# Patient Record
Sex: FEMALE | Race: AMERICAN INDIAN OR ALASKA NATIVE | NOT HISPANIC OR LATINO | Employment: FULL TIME | URBAN - METROPOLITAN AREA
[De-identification: names, ages, dates, MRNs, and addresses within clinical notes are randomized per-mention and may not be internally consistent; named-entity substitution may affect disease eponyms.]

---

## 2017-01-17 ENCOUNTER — APPOINTMENT (OUTPATIENT)
Dept: RADIOLOGY | Facility: MEDICAL CENTER | Age: 57
End: 2017-01-17
Attending: EMERGENCY MEDICINE
Payer: COMMERCIAL

## 2017-01-17 ENCOUNTER — HOSPITAL ENCOUNTER (EMERGENCY)
Facility: MEDICAL CENTER | Age: 57
End: 2017-01-17
Attending: EMERGENCY MEDICINE
Payer: COMMERCIAL

## 2017-01-17 VITALS
HEART RATE: 70 BPM | SYSTOLIC BLOOD PRESSURE: 166 MMHG | TEMPERATURE: 98 F | RESPIRATION RATE: 18 BRPM | WEIGHT: 130.95 LBS | DIASTOLIC BLOOD PRESSURE: 87 MMHG | HEIGHT: 59 IN | BODY MASS INDEX: 26.4 KG/M2 | OXYGEN SATURATION: 98 %

## 2017-01-17 DIAGNOSIS — S92.354A CLOSED NONDISPLACED FRACTURE OF FIFTH METATARSAL BONE OF RIGHT FOOT, INITIAL ENCOUNTER: ICD-10-CM

## 2017-01-17 PROCEDURE — 99284 EMERGENCY DEPT VISIT MOD MDM: CPT

## 2017-01-17 PROCEDURE — 302874 HCHG BANDAGE ACE 2 OR 3""

## 2017-01-17 PROCEDURE — 73630 X-RAY EXAM OF FOOT: CPT | Mod: RT

## 2017-01-17 PROCEDURE — A9270 NON-COVERED ITEM OR SERVICE: HCPCS | Performed by: EMERGENCY MEDICINE

## 2017-01-17 PROCEDURE — 29515 APPLICATION SHORT LEG SPLINT: CPT

## 2017-01-17 PROCEDURE — 700102 HCHG RX REV CODE 250 W/ 637 OVERRIDE(OP): Performed by: EMERGENCY MEDICINE

## 2017-01-17 RX ORDER — HYDROCODONE BITARTRATE AND ACETAMINOPHEN 5; 325 MG/1; MG/1
1 TABLET ORAL EVERY 6 HOURS PRN
Qty: 16 TAB | Refills: 0 | Status: SHIPPED | OUTPATIENT
Start: 2017-01-17

## 2017-01-17 RX ORDER — HYDROCODONE BITARTRATE AND ACETAMINOPHEN 5; 325 MG/1; MG/1
1 TABLET ORAL ONCE
Status: COMPLETED | OUTPATIENT
Start: 2017-01-17 | End: 2017-01-17

## 2017-01-17 RX ADMIN — HYDROCODONE BITARTRATE AND ACETAMINOPHEN 1 TABLET: 5; 325 TABLET ORAL at 22:08

## 2017-01-17 ASSESSMENT — PAIN SCALES - GENERAL
PAINLEVEL_OUTOF10: 4
PAINLEVEL_OUTOF10: 5

## 2017-01-17 NOTE — ED AVS SNAPSHOT
1/17/2017          Iwona Conley  [address]   [place] [address]    Dear Iwona:    [facility] wants to ensure your discharge home is safe and you or your loved ones have had all your questions answered regarding your care after you leave the hospital.    You may receive a telephone call within two days of your discharge.  This call is to make certain you understand your discharge instructions as well as ensure we provided you with the best care possible during your stay with us.     The call will only last approximately 3-5 minutes and will be done by a nurse.    Once again, we want to ensure your discharge home is safe and that you have a clear understanding of any next steps in your care.  If you have any questions or concerns, please do not hesitate to contact us, we are here for you.  Thank you for choosing [facility] for your healthcare needs.    Sincerely,    Declan Mclean    [facility]

## 2017-01-17 NOTE — ED AVS SNAPSHOT
[employer] Access Code: Activation code not generated  Current [employer] Status: Active    "[employer]hart  A secure, online tool to manage your health information     [employer]’s [employer]® is a secure, online tool that connects you to your personalized health information from the privacy of your home -- day or night - making it very easy for you to manage your healthcare. Once the activation process is completed, you can even access your medical information using the [employer]ologie Biol[employer] angelica, which is available for free in the Apple Angelica store or Google Play store.     [employer] provides the following levels of access (as shown below):   My Chart Features   [facility] Primary Care Doctor [facility]  Specialists [facility]  Urgent  Care Non-[facility]  Primary Care  Doctor   Email your healthcare team securely and privately 24/7 X X X X   Manage appointments: schedule your next appointment; view details of past/upcoming appointments X      Request prescription refills. X      View recent personal medical records, including lab and immunizations X X X X   View health record, including health history, allergies, medications X X X X   Read reports about your outpatient visits, procedures, consult and ER notes X X X X   See your discharge summary, which is a recap of your hospital and/or ER visit that includes your diagnosis, lab results, and care plan. X X       How to register for [employer]:  1. Go to  [email or web address].  2. Click on the Sign Up Now box, which takes you to the New Member Sign Up page. You will need to provide the following information:  a. Enter your [employer] Access Code exactly as it appears at the top of this page. (You will not need to use this code after you’ve completed the sign-up process. If you do not sign up before the expiration date, you must request a new code.)   b. Enter your date of birth.   c. Enter your home email address.   d. Click Submit, and follow the next screen’s instructions.  3. Create a [employer] ID. This will  be your [facility] login ID and cannot be changed, so think of one that is secure and easy to remember.  4. Create a [employer] password. You can change your password at any time.  5. Enter your Password Reset Question and Answer. This can be used at a later time if you forget your password.   6. Enter your e-mail address. This allows you to receive e-mail notifications when new information is available in [employer].  7. Click Sign Up. You can now view your health information.    For assistance activating your [employer] account, call [phone]

## 2017-01-17 NOTE — ED AVS SNAPSHOT
After Visit Summary                                                                                                                Iwona Conley   MRN: [id number]    Department:  [facility], Emergency Dept   Date of Visit:  1/17/2017            [facility], Emergency Dept    [address]    [place] [address]    Phone:  [phone]      You were seen by     Antony Arana M.D.      Your Diagnosis Was     Closed nondisplaced fracture of fifth metatarsal bone of right foot, initial encounter     S92.354A       These are the medications you received during your hospitalization from 01/17/2017 1926 to 01/17/2017 2229     Date/Time Order Dose Route Action    01/17/2017 2208 hydrocodone-acetaminophen (NORCO) 5-325 MG per tablet 1 Tab 1 Tab Oral Given      Follow-up Information     1. Follow up with Desmond Gutierrez M.D.. Call in 1 day.    Specialty:  Orthopaedics    Contact information    [address]  [address]  [place] [id number] [phone]        Medication Information     Review all of your home medications and newly ordered medications with your primary doctor and/or pharmacist as soon as possible. Follow medication instructions as directed by your doctor and/or pharmacist.     Please keep your complete medication list with you and share with your physician. Update the information when medications are discontinued, doses are changed, or new medications (including over-the-counter products) are added; and carry medication information at all times in the event of emergency situations.               Medication List      START taking these medications        Instructions    hydrocodone-acetaminophen 5-325 MG Tabs per tablet   Commonly known as:  NORCO    Take 1 Tab by mouth every 6 hours as needed.   Dose:  1 Tab               Procedures and tests performed during your visit     DX-FOOT-COMPLETE 3+ RIGHT            Patient Information     Patient  Information    Following emergency treatment: all patient requiring follow-up care must return either to a private physician or a clinic if your condition worsens before you are able to obtain further medical attention, please return to the emergency room.     Billing Information    [facility] we work to make the billing process streamlined for our patients.  Our Representatives are here to answer any questions you may have regarding your hospital bill.  If you have insurance coverage and have supplied your insurance information to us, we will submit a claim to your insurer on your behalf.  Should you have any questions regarding your bill, we can be reached online or by phone as follows:  Online: You are able pay your bills online or live chat with our representatives about any billing questions you may have. We are here to help Monday - Friday from 8:00am to 7:30pm and 9:00am - 12:00pm on Saturdays.  Please visit [email or web address]  for more information.   Phone:  [phone] or [phone]    Please note that your emergency physician, surgeon, pathologist, radiologist, anesthesiologist, and other specialists are not employed by [facility] and will therefore bill separately for their services.  Please contact them directly for any questions concerning their bills at the numbers below:     Emergency Physician Services:  [phone]  [facility]:  [phone]  [facility]:  [phone]  [facility]:  [phone]    1. Your final bill may vary from the amount quoted upon discharge if all procedures are not complete at that time, or if your doctor has additional procedures of which we are not aware. You will receive an additional bill if you return to the Emergency Department at [facility] for suture removal regardless of the facility of which the sutures were placed.     2. Please arrange for settlement of this account  at the emergency registration.    3. All self-pay accounts are due in full at the time of treatment.  If you are unable to meet this obligation then payment is expected within 4-5 days.     4. If you have had radiology studies (CT, X-ray, Ultrasound, MRI), you have received a preliminary result during your emergency department visit. Please contact the radiology department [phone] to receive a copy of your final result. Please discuss the Final result with your primary physician or with the follow up physician provided.     Crisis Hotline:  [place] Crisis Hotline:  4-932-FGRGSSN or 1-814.158.4299  [place] Crisis Hotline:    1-792.350.5996 or 001-767-7508         ED Discharge Follow Up Questions    1. In order to provide you with very good care, we would like to follow up with a phone call in the next few days.  May we have your permission to contact you?     YES /  NO    2. What is the best phone number to call you? (       )_____-__________    3. What is the best time to call you?      Morning  /  Afternoon  /  Evening                   Patient Signature:  ____________________________________________________________    Date:  ____________________________________________________________

## 2017-01-18 NOTE — ED NOTES
Pt given discharge and follow up instructions and prescriptions, all questions answered, instructed not to drive while taking narcotics, pt verbalized understanding. Pt discharged with family. Copy of discharge provided to pt. Signed copy in chart.  Pt states that all personal belongings are in possession.

## 2017-01-18 NOTE — ED PROVIDER NOTES
ED Provider Note    HPI: Patient is a 56-year-old female who presented to the emergency Department generous 17 2017 at 7:26 PM with a chief complaint of right foot pain.    Patient twisted her foot in a parking lot. She denies any ankle or knee pain. She denied numbness or tingling of the right lower extremity. No other somatic complaints.    Review of Systems: Positive right foot pain negative right ankle or knee pain negative for numbness tingling right lower extremity    Past medical/surgical history: Breast biopsy    Medications: None    Allergies: None    Social History: No drug or alcohol use      Physical exam: Constitutional: Pleasant female awake and alert  Vital signs:  Blood pressure 127/81 pulse 65 temperature 98.4 respirations 16 pulse oximetry 99%  Musculoskeletal: Across the dorsal aspect of the right foot that slightly increased with palpation. No crepitance felt no deformity seen. I can elicit no pain with palpation of ankle or knee unaffected extremity. Dorsalis pedis pulse 2+ capillary refill less than 2 seconds  Neurologic: alert and awake answers questions appropriately. Moves all four extremities independently, no gross focal abnormalities identified. Normal strength and motor.  Skin: no rash or lesion seen, no palpable dermatologic lesions identified.    Medical decision making:  X-ray right foot obtained; fracture the base of the 5th metatarsal is noted.    Patient is slightly hypertensive on arrival. I did not think this had anything to do with the patient's presenting complaints. She'll follow up with her primary care physician for recheck.    Patient placed in an OCL and discharged on crutches. She is given a dose of Norco here. She'll be discharged on Norco (drug database reviewed, no evidence of inappropriate utilization    Patient referred to follow-up with Dr. Gutierrez, orthopedist on call. She is carefully counseled to return to ED for increasing pain numbness tingling or any other  problems    Patient verbalized understanding these instructions and states she will comply    Impression fractured metatarsal, 5th, right, closed

## 2017-01-18 NOTE — ED NOTES
"Chief Complaint   Patient presents with   • Foot Pain     R side. Pt twisted foot and now reporting shooting pain. Denies numbness or tingling. CMS intact.      /81 mmHg  Pulse 65  Temp(Src) 36.9 °C (98.4 °F) (Temporal)  Resp 16  Ht 1.499 m (4' 11.02\")  Wt 59.4 kg (130 lb 15.3 oz)  BMI 26.44 kg/m2  SpO2 99%    Pt ambulated into triage, complaints as above. VS as above, NAD, encouraged to return to the triage nurse or tech with any new complaints or symptoms.  "

## 2020-02-19 ENCOUNTER — HOSPITAL ENCOUNTER (OUTPATIENT)
Dept: CARDIOLOGY | Facility: MEDICAL CENTER | Age: 60
End: 2020-02-19
Attending: NURSE PRACTITIONER
Payer: COMMERCIAL

## 2020-02-19 DIAGNOSIS — R01.1 CARDIAC MURMUR: ICD-10-CM

## 2020-02-19 LAB
LV EJECT FRACT  99904: 65
LV EJECT FRACT MOD 2C 99903: 70.41
LV EJECT FRACT MOD 4C 99902: 71.28
LV EJECT FRACT MOD BP 99901: 71.17

## 2020-02-19 PROCEDURE — 93306 TTE W/DOPPLER COMPLETE: CPT | Mod: 26 | Performed by: INTERNAL MEDICINE

## 2020-02-19 PROCEDURE — 93306 TTE W/DOPPLER COMPLETE: CPT

## 2020-08-14 ENCOUNTER — OFFICE VISIT (OUTPATIENT)
Dept: URGENT CARE | Facility: PHYSICIAN GROUP | Age: 60
End: 2020-08-14
Payer: COMMERCIAL

## 2020-08-14 VITALS
RESPIRATION RATE: 18 BRPM | BODY MASS INDEX: 27.34 KG/M2 | TEMPERATURE: 97.6 F | SYSTOLIC BLOOD PRESSURE: 122 MMHG | DIASTOLIC BLOOD PRESSURE: 90 MMHG | HEART RATE: 60 BPM | HEIGHT: 59 IN | WEIGHT: 135.6 LBS | OXYGEN SATURATION: 99 %

## 2020-08-14 DIAGNOSIS — T81.30XA DEHISCENCE OF WOUND: ICD-10-CM

## 2020-08-14 DIAGNOSIS — S81.811A LACERATION OF RIGHT LOWER LEG, INITIAL ENCOUNTER: ICD-10-CM

## 2020-08-14 PROCEDURE — 99203 OFFICE O/P NEW LOW 30 MIN: CPT | Performed by: NURSE PRACTITIONER

## 2020-08-14 RX ORDER — CETIRIZINE HYDROCHLORIDE 10 MG/1
10 TABLET ORAL DAILY
COMMUNITY

## 2020-08-14 RX ORDER — LEVOTHYROXINE SODIUM 0.07 MG/1
75 TABLET ORAL
COMMUNITY

## 2020-08-14 RX ORDER — AMLODIPINE BESYLATE 10 MG/1
10 TABLET ORAL DAILY
COMMUNITY

## 2020-08-14 RX ORDER — ATENOLOL 50 MG/1
50 TABLET ORAL DAILY
COMMUNITY

## 2020-08-14 ASSESSMENT — ENCOUNTER SYMPTOMS
CHILLS: 0
BRUISES/BLEEDS EASILY: 0
MYALGIAS: 1
WEAKNESS: 0
FEVER: 0
SENSORY CHANGE: 0
TINGLING: 0

## 2020-08-15 NOTE — PROCEDURES
Laceration Repair    Date/Time: 8/14/2020 7:15 PM  Performed by: DALI Ramos  Authorized by: DALI Ramos   Body area: lower extremity  Laceration length: 2.5 cm  Foreign bodies: metal  Tendon involvement: none  Nerve involvement: none  Vascular damage: no    Anesthesia:  Anesthetic total: 0 mL    Sedation:  Patient sedated: no    Irrigation solution: saline  Irrigation method: syringe  Amount of cleaning: standard  Debridement: none  Degree of undermining: none  Skin closure: Steri-Strips  Technique: simple  Approximation: close  Approximation difficulty: simple  Comments: Gauze and coban.

## 2020-08-15 NOTE — PROGRESS NOTES
"Subjective:      Iwona Conley is a 59 y.o. female who presents with Wound Check ((R) leg, pt states a stitch fell out and she finished her anitbiotics and is having rednes with discharge from wound )            HPI  Laceration repair at [facility] ER one week ago from injury in her yard. States 2 sutures \"have fallen out\" and states \"the other three are loose\". Taking Keflex 4x/day x 7 days, Has one pill left to take. States could be \"infected\" due to erythema around suture line. Denies swelling and drainage at site. Leg \"hurts\" at end of day due to being her her feet all day. Denies calf swelling or pain.    PMH:  has no past medical history on file.  MEDS:   Current Outpatient Medications:   •  levothyroxine (SYNTHROID) 75 MCG Tab, Take 75 mcg by mouth Every morning on an empty stomach., Disp: , Rfl:   •  atenolol (TENORMIN) 50 MG Tab, Take 50 mg by mouth every day., Disp: , Rfl:   •  amLODIPine (NORVASC) 10 MG Tab, Take 10 mg by mouth every day., Disp: , Rfl:   •  cetirizine (ZYRTEC) 10 MG Tab, Take 10 mg by mouth every day., Disp: , Rfl:   •  hydrocodone-acetaminophen (NORCO) 5-325 MG Tab per tablet, Take 1 Tab by mouth every 6 hours as needed. (Patient not taking: Reported on 8/14/2020), Disp: 16 Tab, Rfl: 0  ALLERGIES:   Allergies   Allergen Reactions   • Lisinopril Cough     SURGHX:   Past Surgical History:   Procedure Laterality Date   • BREAST BIOPSY      right stereotactic bx date   ?     SOCHX:    FH: Family history was reviewed, no pertinent findings to report    Review of Systems   Constitutional: Negative for chills, fever and malaise/fatigue.   Cardiovascular: Negative for leg swelling.   Musculoskeletal: Positive for myalgias. Negative for joint pain.   Skin: Negative for itching and rash.   Neurological: Negative for tingling, sensory change and weakness.   Endo/Heme/Allergies: Does not bruise/bleed easily.   All other systems reviewed and are negative.         Objective:     BP " "122/90 (BP Location: Left arm, Patient Position: Sitting, BP Cuff Size: Adult)   Pulse 60   Temp 36.4 °C (97.6 °F) (Temporal)   Resp 18   Ht 1.499 m (4' 11\")   Wt 61.5 kg (135 lb 9.6 oz)   SpO2 99%   BMI 27.39 kg/m²      Physical Exam  Vitals signs reviewed.   Constitutional:       General: She is awake. She is not in acute distress.     Appearance: Normal appearance. She is well-developed. She is not ill-appearing, toxic-appearing or diaphoretic.   HENT:      Head: Normocephalic.   Cardiovascular:      Rate and Rhythm: Normal rate.   Pulmonary:      Effort: Pulmonary effort is normal. No accessory muscle usage or respiratory distress.   Musculoskeletal:         General: Tenderness and signs of injury present. No swelling or deformity.      Right lower leg: She exhibits tenderness. She exhibits no bony tenderness, no swelling and no deformity. No edema.   Skin:     General: Skin is warm and dry.      Findings: Erythema, laceration and wound present. No abrasion, abscess, bruising, ecchymosis or rash.      Comments: V-shaped mildly dehisced laceration repair with 3 intact interrupted sutures. Approx 2.5 cm in length. No swelling or drainage from site. Mild TTP at site. No indication for infection.   Neurological:      Mental Status: She is alert and oriented to person, place, and time.   Psychiatric:         Behavior: Behavior is cooperative.                 Assessment/Plan:        1. Laceration of right lower leg, initial encounter      2. Dehiscence of wound  Finish abx  Steri-strips will fall off on own   May use tylenol or ibuprofen prn for discomfort  Keep bandage clean and dry, remove and change daily, if soiled or wet remove and replace  Mild soap and water, do not scrub, pat dry  No TAB ointment to steri-strips  May use ice for anyswelling or discomfort  Monitor for increased swelling, redness and increased heat to site, d/c, fever, red streaking- need re-evaluation immediately      "

## 2021-03-15 DIAGNOSIS — Z23 NEED FOR VACCINATION: ICD-10-CM

## 2024-10-17 ENCOUNTER — TELEPHONE (OUTPATIENT)
Dept: HEALTH INFORMATION MANAGEMENT | Facility: OTHER | Age: 64
End: 2024-10-17

## 2024-10-23 ENCOUNTER — APPOINTMENT (OUTPATIENT)
Dept: RADIOLOGY | Facility: MEDICAL CENTER | Age: 64
End: 2024-10-23
Attending: INTERNAL MEDICINE

## 2024-11-11 ENCOUNTER — APPOINTMENT (OUTPATIENT)
Dept: RADIOLOGY | Facility: MEDICAL CENTER | Age: 64
End: 2024-11-11
Attending: INTERNAL MEDICINE
Payer: MEDICARE

## 2024-11-11 DIAGNOSIS — Z12.31 ENCOUNTER FOR MAMMOGRAM TO ESTABLISH BASELINE MAMMOGRAM: ICD-10-CM

## 2024-11-11 PROCEDURE — 77067 SCR MAMMO BI INCL CAD: CPT
